# Patient Record
Sex: FEMALE | Race: WHITE
[De-identification: names, ages, dates, MRNs, and addresses within clinical notes are randomized per-mention and may not be internally consistent; named-entity substitution may affect disease eponyms.]

---

## 2018-11-29 ENCOUNTER — HOSPITAL ENCOUNTER (EMERGENCY)
Dept: HOSPITAL 74 - FER | Age: 83
Discharge: HOME | End: 2018-11-29
Payer: COMMERCIAL

## 2018-11-29 VITALS — DIASTOLIC BLOOD PRESSURE: 80 MMHG | HEART RATE: 79 BPM | TEMPERATURE: 97.9 F | SYSTOLIC BLOOD PRESSURE: 135 MMHG

## 2018-11-29 VITALS — BODY MASS INDEX: 21.2 KG/M2

## 2018-11-29 DIAGNOSIS — S09.90XA: Primary | ICD-10-CM
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
## 2018-11-29 DIAGNOSIS — S00.83XA: ICD-10-CM
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
## 2018-11-29 DIAGNOSIS — S80.01XA: ICD-10-CM

## 2018-11-29 NOTE — PDOC
History of Present Illness





- General


History Source: Patient, Family, Old Records


Exam Limitations: No Limitations





- History of Present Illness


Initial Comments: 





11/29/18 20:03


The patient is a 84 year old female presenting with her granddaughters, with a 

significant past medical history of HTN, who presents to the ED complaining of 

hitting her head when she fell earlier today. She reports that she heard some 

noise outside of her home and went out to check. She notes that she fell and 

hit her head and right knee on concrete. She does not illicit pain on 

presentation for her head injury but notes a bruise on her left forehead. She 

denies any factors leading up to her fall. She denies any knee pain but also 

notes a bruise on her knee. She reports that she was feeling fine after the 

fall and did not want to come to the ED. She came to the ED on the behest of 

her family. She denies any kind of pain. 





The patient denies chest pain, shortness of breath, neck pain, back pain, 

headache and dizziness. Denies fever, chills, nausea, vomiting, diarrhea or 

constipation. Denies dysuria, frequency, urgency and hematuria. 





Allergies: None 


Past surgical history: Right knee replacement 


Social History: No alcohol, tobacco or drug use reported 








<Renzo France - Last Filed: 11/29/18 23:20>





<Sofia Nguyen - Last Filed: 11/30/18 00:57>





- General


Chief Complaint: Injury


Stated Complaint: INJURY,FALL HIT HEAD


Time Seen by Provider: 11/29/18 19:21





Past History





<Renzo France - Last Filed: 11/29/18 23:20>





- Past Medical History


HTN: Yes





- Immunization History


Immunization Up to Date: Yes





- Suicide/Smoking/Psychosocial Hx


Smoking History: Unknown if ever smoked


Have you smoked in the past 12 months: No


If you are a former smoker, when did you quit?: Over 40 years ago


Hx Alcohol Use: No


Drug/Substance Use Hx: No


Substance Use Type: None


Hx Substance Use Treatment: No





<Sofia Nguyen - Last Filed: 11/30/18 00:57>





- Past Medical History


Allergies/Adverse Reactions: 


 Allergies











Allergy/AdvReac Type Severity Reaction Status Date / Time


 


No Known Drug Allergies Allergy   Verified 11/29/18 19:21











Home Medications: 


Ambulatory Orders





Cholecalciferol (Vitamin D3) [Vitamin D] 400 unit PO DAILY 08/03/15 


Enalapril Maleate [Vasotec] 25 mg PO DAILY 08/03/15 











**Review of Systems





- Review of Systems


Able to Perform ROS?: Yes


Comments:: 





11/29/18 20:03


GENERAL/CONSTITUTIONAL: No fever or chills. No weakness.


HEAD, EYES, EARS, NOSE AND THROAT: (+) Left forehead bruise. No change in 

vision. No ear pain or discharge. No sore throat.


GASTROINTESTINAL: No nausea, vomiting, diarrhea or constipation.


GENITOURINARY: No dysuria, frequency, or change in urination.


CARDIOVASCULAR: No chest pain or shortness of breath.


RESPIRATORY: No cough, wheezing, or hemoptysis.


MUSCULOSKELETAL: No joint or muscle swelling or pain. No neck or back pain.


SKIN: No rash


EXTREMITIES: (+) Right knee bruise 


NEUROLOGIC: No headache, vertigo, loss of consciousness, or change in strength/

sensation.


ENDOCRINE: No increased thirst. No abnormal weight change.


HEMATOLOGIC/LYMPHATIC: No anemia, easy bleeding, or history of blood clots.


ALLERGIC/IMMUNOLOGIC: No hives or skin allergy.








<Renzo France - Last Filed: 11/29/18 23:20>





*Physical Exam





- Vital Signs


 Last Vital Signs











Temp Pulse Resp BP Pulse Ox


 


 97.9 F   79   20   135/80   96 


 


 11/29/18 19:20  11/29/18 19:20  11/29/18 19:20  11/29/18 19:20  11/29/18 19:20














- Physical Exam


Comments: 





11/29/18 20:03


Constitutional: Awake, alert, oriented.  No acute distress.


Head:  (+) Mildly tender, mildly edematous, mildly ecchymosis 4 cm diameter 

contusion on the left side of her forehead. Normocephalic. 


Eyes:  PERRL. EOMI.  Conjunctivae are not pale.


ENT:  Mucous membranes are moist and intact. Posterior pharynx without exudates 

or erythema. Uvula midline.


Neck:  Supple.  Full ROM. No lymphadenopathy.


Cardiovascular:  Regular rate.  Regular rhythm. S1, S2 regular.  Distal pulses 

are 2+ and symmetric.  


Pulmonary/Chest:  No evidence of respiratory distress.  Clear to auscultation 

bilaterally  No wheezing, rales or rhonchi.


Abdominal:  Soft and non-distended.  There is no tenderness.  No rebound, 

guarding or rigidity.  No organomegaly. No palpable masses. Good bowel sounds.


Back:  No CVA tenderness.


Musculoskeletal:  (+) Faint ecchymosis and non bleeding abrasion of the 

anterior right knee, with also some mild tenderness, no edema, no deformity. No 

cyanosis.  No clubbing.  Full range of motion in all extremities.  No calf 

tenderness. Radial/pedal pulses are intact and 2+ bilaterally


Skin:  Skin is warm and dry.  No petechiae.  No purpura.  


Neurological:  Alert and oriented to person, place, and time.  Cranial nerves II

-XII are grossly intact. Normal speech. Strength is grossly symmetric. No 

sensory deficits.


Psychiatric:  Good eye contact.  Normal interaction, affect and behavior.





<Renzo France - Last Filed: 11/29/18 23:20>





Moderate Sedation





- Procedure Monitoring


Vital Signs: 


Procedure Monitoring Vital Signs











Temperature  97.9 F   11/29/18 19:20


 


Pulse Rate  79   11/29/18 19:20


 


Respiratory Rate  20   11/29/18 19:20


 


Blood Pressure  135/80   11/29/18 19:20


 


O2 Sat by Pulse Oximetry (%)  96   11/29/18 19:20











<Renzo France - Last Filed: 11/29/18 23:20>





Progress Note





- Progress Note


Progress Note: 





Documentation has been prepared under my direction and personally reviewed by 

me in its entirety. I attest that this documented accurately reflects all work, 

treatment, procedures and medical decision making performed by me.





<Sofia Nguyen - Last Filed: 11/30/18 00:57>





Medical Decision Making





- Medical Decision Making





As noted above, this 84-year-old woman presents with a history of tripping and 

falling on an uneven surface outside of her home earlier today.  Patient 

vehemently denies antecedent lightheadedness or other physical symptoms prior 

to falling.  She states that sidewalk was uneven and she tripped.  Patient 

struck her forehead and right knee but denies LOC.  No significant symptoms 

occurred through the afternoon and early evening.  She was convinced by her 

family to come to the emergency room for full evaluation.  Exam as noted.





Although patient is not taking any anticoagulation medications, because of her 

advanced age, noncontrast head CT will be performed to evaluate for acute 

intracranial pathology.  Also, right knee x-ray will be performed.





Noncontrast head CT performed and interpreted by Dr. Moreno of radiology staff: 

No evidence of acute intracranial process present.  There is evidence of 

chronic micro-vascular ischemic changes.  There is no significant change from 

noncontrast head CT performed 4 and half years ago here.





Right knee x-ray shows that patient had total knee replacement in this joint.  

There is no fracture or dislocation; furthermore, no evidence of loosening of 

the hardware in her knee.








<Sofia Nguyen - Last Filed: 11/30/18 00:57>





*DC/Admit/Observation/Transfer





- Attestations


Scribe Attestion: 





11/29/18 20:03





Documentation prepared by Renzo France, acting as medical scribe for Sofia Nguyen MD





<Renzo France - Last Filed: 11/29/18 23:20>





<Sofia Nguyen - Last Filed: 11/30/18 00:57>


Diagnosis at time of Disposition: 


Forehead contusion


Qualifiers:


 Encounter type: initial encounter Qualified Code(s): S00.83XA - Contusion of 

other part of head, initial encounter





Contusion, knee


Qualifiers:


 Encounter type: initial encounter Laterality: right Qualified Code(s): 

S80.01XA - Contusion of right knee, initial encounter





Closed head injury


Qualifiers:


 Encounter type: initial encounter Qualified Code(s): S09.90XA - Unspecified 

injury of head, initial encounter








- Discharge Dispostion


Disposition: HOME


Condition at time of disposition: Stable





- Patient Instructions


Printed Discharge Instructions:  DI for Closed Head Injury


Additional Instructions: 


cold compresses to forehead bruise for the next 2 days


extra pillow tonight


Tylenol as needed for pain for the next 2 days, then Motrin/Aleve/Tylenol as 

needed


return to ER if you have headache/vomiting/lightheadedness


Follow-up with your doctor within the next 5 days

## 2019-02-08 ENCOUNTER — HOSPITAL ENCOUNTER (OUTPATIENT)
Dept: HOSPITAL 74 - JER | Age: 84
Setting detail: OBSERVATION
LOS: 1 days | Discharge: HOME | End: 2019-02-09
Attending: INTERNAL MEDICINE | Admitting: INTERNAL MEDICINE
Payer: COMMERCIAL

## 2019-02-08 VITALS — BODY MASS INDEX: 27.4 KG/M2

## 2019-02-08 DIAGNOSIS — I10: ICD-10-CM

## 2019-02-08 DIAGNOSIS — R55: Primary | ICD-10-CM

## 2019-02-08 PROCEDURE — G0378 HOSPITAL OBSERVATION PER HR: HCPCS

## 2019-02-08 NOTE — PDOC
Attending Attestation





- Resident


Resident Name: Valentina Lancaster





- ED Attending Attestation


I have performed the following: I have examined & evaluated the patient, The 

case was reviewed & discussed with the resident, I agree w/resident's findings 

& plan, Exceptions are as noted





- HPI


HPI: 





02/09/19 00:27


The patient is an 84-year-old female with a past medical history significant 

for HTN presents to the emergency department via EMS s/p a syncopal episode. 

The patient reports she was at a bin  event when she had a 

witnessed syncopal episode that was witnessed by daughter and granddaughter. 

The patient slumped forward with LOC, nearby nurses helped the patient to lay 

down on the floor. Following, the patient spontaneously regained consciousness. 

Immediately following, the patient had an episode of vomiting. Denies any 

symptoms prior to LOC, lightheadedness, headache, neck pain, neurological 

symptoms, chest pain, shortness of breath diarrhea, melena, bpr, fever/chills, 

cough, dysuria,  or known sick contact. 





- Physicial Exam


PE: 





02/09/19 00:06


GENERAL: The patient is awake, alert, and fully oriented, Nontoxic - in no 

acute distress.


HEAD: Normocephalic, atraumatic.


EYES: extraocular movements intact, sclera anicteric, conjunctiva clear.


ENT: Normal voice,  mildly dry mucous membranes.


NECK: Normal range of motion, supple


LUNGS: Breath sounds equal, clear to auscultation bilaterally.  No wheezes, no 

rhonchi, no rales.


HEART: Regular rate and rhythm, normal S1 and S2 without murmur, rub or gallop.


ABDOMEN: Soft, nontender, normoactive bowel sounds.  No guarding, no rebound.  

. No CVA tenderness


EXTREMITIES: Normal range of motion, no edema.  No clubbing or cyanosis. No 

cords, erythema, or tenderness.


NEUROLOGICAL: No facial assymetry, Normal speech, 


PSYCH: Normal mood, normal affect.


SKIN: Warm, Dry, normal turgor,








- Medical Decision Making





02/08/19 23:51


84y F hx of htn, presents sp witnessed syncopal event. Was at a bingo 

 and had a blank look on her face and slumped over. Woke up 

spontaneously when she laid flat, vomited and has been feeling fine since then. 

Deneis any chest pain, palpitations, sob, dizziness, prior n/v, abd pain, back 

pain, nekc pan, fever/chills, cough, sob.  in the field. 








ddx includes but not limited to anemia, metabolic dernagement, arrythmia, acs, 

vagal episode, dehydraiton


kristopher lcklbas, 


pt placed on cardiac monitor


gentle fluids


ekg


ua


will reassess, likely obs if w/o neg to r/o arrythmia

## 2019-02-09 VITALS — DIASTOLIC BLOOD PRESSURE: 80 MMHG | TEMPERATURE: 98 F | SYSTOLIC BLOOD PRESSURE: 152 MMHG | HEART RATE: 64 BPM

## 2019-02-09 LAB
ALBUMIN SERPL-MCNC: 3.2 G/DL (ref 3.4–5)
ALBUMIN SERPL-MCNC: 3.4 G/DL (ref 3.4–5)
ALP SERPL-CCNC: 63 U/L (ref 45–117)
ALP SERPL-CCNC: 68 U/L (ref 45–117)
ALT SERPL-CCNC: 14 U/L (ref 13–61)
ALT SERPL-CCNC: 17 U/L (ref 13–61)
ANION GAP SERPL CALC-SCNC: 5 MMOL/L (ref 8–16)
ANION GAP SERPL CALC-SCNC: 5 MMOL/L (ref 8–16)
AST SERPL-CCNC: 15 U/L (ref 15–37)
AST SERPL-CCNC: 16 U/L (ref 15–37)
BASOPHILS # BLD: 0.9 % (ref 0–2)
BASOPHILS # BLD: 1.1 % (ref 0–2)
BILIRUB SERPL-MCNC: 0.5 MG/DL (ref 0.2–1)
BILIRUB SERPL-MCNC: 0.7 MG/DL (ref 0.2–1)
BUN SERPL-MCNC: 11 MG/DL (ref 7–18)
BUN SERPL-MCNC: 12 MG/DL (ref 7–18)
CALCIUM SERPL-MCNC: 8.2 MG/DL (ref 8.5–10.1)
CALCIUM SERPL-MCNC: 8.7 MG/DL (ref 8.5–10.1)
CHLORIDE SERPL-SCNC: 102 MMOL/L (ref 98–107)
CHLORIDE SERPL-SCNC: 103 MMOL/L (ref 98–107)
CO2 SERPL-SCNC: 31 MMOL/L (ref 21–32)
CO2 SERPL-SCNC: 32 MMOL/L (ref 21–32)
CREAT SERPL-MCNC: 0.7 MG/DL (ref 0.55–1.3)
CREAT SERPL-MCNC: 0.7 MG/DL (ref 0.55–1.3)
DEPRECATED RDW RBC AUTO: 14.2 % (ref 11.6–15.6)
DEPRECATED RDW RBC AUTO: 14.2 % (ref 11.6–15.6)
EOSINOPHIL # BLD: 1.9 % (ref 0–4.5)
EOSINOPHIL # BLD: 2.2 % (ref 0–4.5)
GLUCOSE SERPL-MCNC: 120 MG/DL (ref 74–106)
GLUCOSE SERPL-MCNC: 90 MG/DL (ref 74–106)
HCT VFR BLD CALC: 39.3 % (ref 32.4–45.2)
HCT VFR BLD CALC: 40.1 % (ref 32.4–45.2)
HGB BLD-MCNC: 13.5 GM/DL (ref 10.7–15.3)
HGB BLD-MCNC: 13.7 GM/DL (ref 10.7–15.3)
LYMPHOCYTES # BLD: 13.1 % (ref 8–40)
LYMPHOCYTES # BLD: 19.7 % (ref 8–40)
MAGNESIUM SERPL-MCNC: 2.2 MG/DL (ref 1.8–2.4)
MAGNESIUM SERPL-MCNC: 2.4 MG/DL (ref 1.8–2.4)
MCH RBC QN AUTO: 30.9 PG (ref 25.7–33.7)
MCH RBC QN AUTO: 31.1 PG (ref 25.7–33.7)
MCHC RBC AUTO-ENTMCNC: 34.3 G/DL (ref 32–36)
MCHC RBC AUTO-ENTMCNC: 34.4 G/DL (ref 32–36)
MCV RBC: 90 FL (ref 80–96)
MCV RBC: 90.3 FL (ref 80–96)
MONOCYTES # BLD AUTO: 10.8 % (ref 3.8–10.2)
MONOCYTES # BLD AUTO: 9.8 % (ref 3.8–10.2)
NEUTROPHILS # BLD: 66.5 % (ref 42.8–82.8)
NEUTROPHILS # BLD: 74 % (ref 42.8–82.8)
PHOSPHATE SERPL-MCNC: 3.1 MG/DL (ref 2.5–4.9)
PHOSPHATE SERPL-MCNC: 3.1 MG/DL (ref 2.5–4.9)
PLATELET # BLD AUTO: 242 K/MM3 (ref 134–434)
PLATELET # BLD AUTO: 247 K/MM3 (ref 134–434)
PMV BLD: 7.4 FL (ref 7.5–11.1)
PMV BLD: 7.5 FL (ref 7.5–11.1)
POTASSIUM SERPLBLD-SCNC: 4.1 MMOL/L (ref 3.5–5.1)
POTASSIUM SERPLBLD-SCNC: 4.4 MMOL/L (ref 3.5–5.1)
PROT SERPL-MCNC: 6.3 G/DL (ref 6.4–8.2)
PROT SERPL-MCNC: 6.7 G/DL (ref 6.4–8.2)
RBC # BLD AUTO: 4.35 M/MM3 (ref 3.6–5.2)
RBC # BLD AUTO: 4.45 M/MM3 (ref 3.6–5.2)
SODIUM SERPL-SCNC: 138 MMOL/L (ref 136–145)
SODIUM SERPL-SCNC: 139 MMOL/L (ref 136–145)
WBC # BLD AUTO: 8.5 K/MM3 (ref 4–10)
WBC # BLD AUTO: 9.8 K/MM3 (ref 4–10)

## 2019-02-09 PROCEDURE — 3E0337Z INTRODUCTION OF ELECTROLYTIC AND WATER BALANCE SUBSTANCE INTO PERIPHERAL VEIN, PERCUTANEOUS APPROACH: ICD-10-PCS | Performed by: INTERNAL MEDICINE

## 2019-02-09 NOTE — PDOC
History of Present Illness





- General


Chief Complaint: Syncope/Near Syncope


Stated Complaint: SYNCOPE VOMITING


Time Seen by Provider: 02/08/19 23:42





- History of Present Illness


Initial Comments: 


Asad Dykes is an 83yo woman with a PMH of HTN who presents after a 

witnessed episode of syncope and vomiting tonight. She presents with her 

daughter and granddaughter, who witnessed the event. Ms Dykes was with her 

family playing Bingo at a [employer] event tonight. Her daughter states that she 

suddenly had a "blank look" on her face and then slumped forward in her chair. 

There were several nurses present at the event, and they helped lay her down 

flat on the ground. She regained consciousness after about a minute or less, 

and she had an episode of vomiting after waking up. 





Ms Dykes denies any symptoms preceding the syncope including lightheadedness

, nausea, chest pain, SOB, weakness or any neurological symptoms. After waking, 

she states that she has felt in her normal state of health and currently denies 

any symptoms. 








Past History





- Past Medical History


Allergies/Adverse Reactions: 


 Allergies











Allergy/AdvReac Type Severity Reaction Status Date / Time


 


No Known Drug Allergies Allergy   Verified 02/08/19 22:59











Home Medications: 


Ambulatory Orders





Ramipril 2.5 mg PO DAILY 02/09/19 








COPD: No


HTN: Yes





- Immunization History


Immunization Up to Date: Yes





- Suicide/Smoking/Psychosocial Hx


Smoking History: Never smoked


Have you smoked in the past 12 months: No


If you are a former smoker, when did you quit?: Over 40 years ago


Information on smoking cessation initiated: No


Hx Alcohol Use: No


Drug/Substance Use Hx: No


Substance Use Type: None


Hx Substance Use Treatment: No





**Review of Systems





- Review of Systems


Comments:: 


General: No fevers, no chills, no weight or appetite change, no malaise


HEENT: No changes in vision, no changes in hearing, no congestion, no sore 

throat


CV: No chest pain, no palpitations, no LE edema. h/o HTN


Pulm: No SOB, no cough, no wheezing


GI: No nausea or vomiting, no change in bowel habits, no melena


: No frequency, no urgency, no dysuria


Musc: No back pain, no joint swelling, no recent injury


Skin: No rash, no lesions, no erythema


Endo: No excessive thirst, no heat/cold intolerance


Heme: No unusual bruising or bleeding, no swollen glands


Neuro: +syncope. No numbness/tingling, no focal weakness


Vasc: No claudication


Psych: No recent change in mood, no SI or HI











*Physical Exam





- Vital Signs


 Last Vital Signs











Temp Pulse Resp BP Pulse Ox


 


 97.6 F   66   18   132/89   99 


 


 02/08/19 23:00  02/08/19 23:00  02/08/19 23:00  02/08/19 23:00  02/08/19 23:00














- Physical Exam


Comments: 


General: Comfortable, no acute distress


HEENT: PERRL, EOMI, MMM, voice normal, normal neck ROM, no LAD


Cards: RRR, no murmur appreciated


Pulm: Comfortable on room air, clear to auscultation bilaterally


Abd: Soft, nontender, nondistended


: No CVA tenderness


Ext: Atraumatic. Trace BLE edema. ROM intact. Strength 5/5 and equal bilaterally


Vasc: Extremities WWP. 


Skin: Normal color, no rashes or lesions


Neuro: A&Ox3, CN grossly intact, normal speech, motor/sensory grossly intact 

and symmetric. No focal deficits noted. 


Psych: Mood appropriate to situation











Moderate Sedation





- Procedure Monitoring


Vital Signs: 


Procedure Monitoring Vital Signs











Temperature  97.6 F   02/08/19 23:00


 


Pulse Rate  66   02/08/19 23:00


 


Respiratory Rate  18   02/08/19 23:00


 


Blood Pressure  132/89   02/08/19 23:00


 


O2 Sat by Pulse Oximetry (%)  99   02/08/19 23:00











ED Treatment Course





- LABORATORY


CBC & Chemistry Diagram: 


 02/09/19 00:25





 02/09/19 00:25





- RADIOLOGY


Radiology Studies Ordered: 














 Category Date Time Status


 


 CHEST PA & LAT [RAD] Stat Radiology  02/08/19 23:39 Ordered














Medical Decision Making





- Medical Decision Making





02/08/19 23:58


Asad Dykes is an 83yo woman with a PMH of HTN who presents after a 

witnessed episode of syncope followed by vomiting while playing Bingo tonight. 

She is currently asymptomatic.


- Unclear cause at this point, pt denies any symptoms prior to or following the 

syncope event


- No fall, no head injury or neurological symptoms indicating neuro workup. No 

need for imaging


- May be arrhythmia, orthostatic, vagal. No SOB or chest pain suggesting MI, no 

palpitations, no h/o arrhythmia. Pt does report low fluid intake at baseline 

and appears slightly dry, but no significant dehydration nor orthostatic 

symptoms.


- CBC, CMP, mag, phos, UA, CXR, EKG for evaluation


- 500cc bolus NS for rehydration





02/09/19 01:14


- EKG completed. NSR w/ one PAC noted. HR 66, normal intervals, normal axis, no 

ST changes


- Labs reviewed. Unremarkable, no concerning abnormalities


- Discussed admission with Ms Dykes and her daughter. Recommend obs with 

cardiac monitoring to rule out arrhythmia as a cause of syncope. She agrees to 

admission. Will contact hospitalist after CXR is completed.








02/09/19 02:32


- Hospitalist team contacted, will admit for syncope workup/evaluation


- CXR completed. No significant change from previous.





Discussed with Dr Arellano.





Valentina Lancaster


PGY1





*DC/Admit/Observation/Transfer


Diagnosis at time of Disposition: 


Syncope


Qualifiers:


 Syncope type: unspecified Qualified Code(s): R55 - Syncope and collapse








- Discharge Dispostion


Decision to Admit order: Yes





- Referrals





- Patient Instructions





- Post Discharge Activity

## 2019-02-09 NOTE — HP
CHIEF COMPLAINT: syncope





PCP: Dr. Orta





HISTORY OF PRESENT ILLNESS: Patient is an 85 yo F with a PMHx of HTN, who 

presented after a witnessed syncopal episode while playing BINGO at a naveen 

event. Patient's daughter at bedside. She states her mom suddenly had a blank 

look on her face and fell forward on her chair. She had LOC for under a minute. 

Daughter also states she vomited right after she regained consciousness. 

Patient denies preceding symptoms prior to the syncopal episode. She also says 

she has not been drinking enough water lately. She said she had a similar 

episode in 2014 and was admitted. Patient also told the ER resident that she 

sometimes has very low blood pressure. She denies lightheadedness, nausea, 

chest pain, SOB, weakness or any neurological symptoms.  








ER course was notable for:


(1) 1/2 L NS bolus in ER


(2) Trop neg x 1








Recent Travel: denies





PAST MEDICAL HISTORY: per HPI








Family History:


Allergies





No Known Drug Allergies Allergy (Verified 02/08/19 22:59)


 








HOME MEDICATIONS:


 Home Medications











 Medication  Instructions  Recorded


 


Ramipril 2.5 mg PO DAILY 02/09/19








REVIEW OF SYSTEMS


CONSTITUTIONAL: 


Absent:  fever, chills, diaphoresis, generalized weakness, malaise, loss of 

appetite, weight change


HEENT: 


Absent:  rhinorrhea, nasal congestion, throat pain, throat swelling, difficulty 

swallowing, mouth swelling, ear pain, eye pain, visual changes


CARDIOVASCULAR: syncope


Absent: chest pain,  palpitations, irregular heart rate, lightheadedness, 

peripheral edema


RESPIRATORY: 


Absent: cough, shortness of breath, dyspnea with exertion, orthopnea, wheezing, 

stridor, hemoptysis


GASTROINTESTINAL:


Absent: abdominal pain, abdominal distension, nausea, vomiting, diarrhea, 

constipation, melena, hematochezia


GENITOURINARY: 


Absent: dysuria, frequency, urgency, hesitancy, hematuria, flank pain, genital 

pain


NEUROLOGIC: 


Absent: headache, focal weakness or paresthesias, dizziness, unsteady gait, 

seizure, mental status changes, bladder or bowel incontinence








PHYSICAL EXAMINATION


 Vital Signs - 24 hr











  02/08/19





  23:00


 


Temperature 97.6 F


 


Pulse Rate 66


 


Respiratory 18





Rate 


 


Blood Pressure 132/89


 


O2 Sat by Pulse 99





Oximetry (%) 











GENERAL: a/o x 3, nad


HEAD: Normal with no signs of trauma.


EYES: Pupils equal, round and reactive to light, extraocular movements intact, 

sclera anicteric, conjunctiva clear. No lid lag.


EARS, NOSE, THROAT:  oropharynx clear without exudates. Moist mucous membranes.


NECK: supple without lymphadenopathy, JVD, or masses.


LUNGS: Breath sounds equal, clear to auscultation bilaterally. No wheezes, and 

no crackles.


HEART: Regular rate and rhythm, normal S1 and S2 without murmur, rub or gallop.


ABDOMEN: Soft, nontender, not distended, normoactive bowel sounds, no guarding, 

no rebound, no masses.  


LOWER EXTREMITIES: 2+ pulses, warm, well-perfused. No calf tenderness. No 

peripheral edema. 


NEUROLOGICAL:  Cranial nerves II-XII intact. Normal speech. Normal gait.





 Laboratory Results - last 24 hr











  02/09/19 02/09/19





  00:25 00:25


 


WBC  9.8 


 


RBC  4.45 


 


Hgb  13.7 


 


Hct  40.1 


 


MCV  90.0 


 


MCH  30.9 


 


MCHC  34.3 


 


RDW  14.2 


 


Plt Count  242  D 


 


MPV  7.4 L 


 


Absolute Neuts (auto)  7.2 


 


Neutrophils %  74.0  D 


 


Lymphocytes %  13.1  D 


 


Monocytes %  9.8 


 


Eosinophils %  2.2 


 


Basophils %  0.9 


 


Nucleated RBC %  0 


 


Sodium   139


 


Potassium   4.4


 


Chloride   103


 


Carbon Dioxide   32


 


Anion Gap   5 L


 


BUN   12


 


Creatinine   0.7


 


Creat Clearance w eGFR   > 60


 


Random Glucose   120 H


 


Calcium   8.7


 


Phosphorus   3.1


 


Magnesium   2.4


 


Total Bilirubin   0.5


 


AST   16


 


ALT   17


 


Alkaline Phosphatase   68


 


Creatine Kinase   76


 


Troponin I   < 0.02


 


Total Protein   6.7


 


Albumin   3.4











ASSESSMENT/PLAN:





85 yo F with a PMHx of HTN, who presented after a witnessed syncopal episode 

and vomiting.








#Syncope


-cardiogenic vs vasovagal vs orthostatic


-tele monitoring


-cardiology consult: Dr. Iniguez is the last Cardiologist she saw


-First trop neg, repeat Trop


-Echo in AM. Last echo in 2014


-TSH


-U/A





#HTN


-cont. Home med: Rampril 2.5mg


-monitor BP








#FEN


-No Iv fluids


-monitor lytes


-Sodium controlled diet





#DVT


-EAB


-Scds





tele-obs











Visit type





- Emergency Visit


Emergency Visit: Yes


ED Registration Date: 02/09/19


Care time: The patient presented to the Emergency Department on the above date 

and was hospitalized for further evaluation of their emergent condition.





- New Patient


This patient is new to me today: Yes


Date on this admission: 02/11/19





- Critical Care


Critical Care patient: No

## 2019-02-09 NOTE — EKG
Test Reason : 

Blood Pressure : ***/*** mmHG

Vent. Rate : 066 BPM     Atrial Rate : 066 BPM

   P-R Int : 174 ms          QRS Dur : 084 ms

    QT Int : 432 ms       P-R-T Axes : 051 065 051 degrees

   QTc Int : 452 ms

 

NORMAL SINUS RHYTHM

NORMAL ECG

WHEN COMPARED WITH ECG OF 03-AUG-2015 14:18,

NO SIGNIFICANT CHANGE WAS FOUND

Confirmed by Jonah Lance MD ([id number]) on 2/9/2019 3:29:19 PM

 

Referred By:             Confirmed By:Jonah Lance MD

## 2019-02-09 NOTE — CON.CARD
Cardiology Consult (text)





- Consultation


Consultation Note: 





cc: syncope





hpi:  84 f hx htn here with syncope.  Pt was sitting playing bingo yesterday 

when she felt warm and then passed out.  Daughter was there and said her mom 

looked pale and slumped onto table and had loc for about a minute. She awoke 

and vomited and felt fine after.  No cp sob palps dizzy pnd orthopnea le edema.

  She had similar episode in 2014 and again in 2018.  Pt feels well now.





pmh: per hpi


psh: nc


social: no tob


fam:  no premature cad, scd


ros: per hpi; no fever, cough ha vision changes gib hematuria dysuria muscle 

pains


meds:


 Home Medications











 Medication  Instructions  Recorded


 


Ramipril 2.5 mg PO DAILY 02/09/19








pe:


 Vital Signs











 Period  Temp  Pulse  Resp  BP Sys/Tate  Pulse Ox


 


 Last 24 Hr  97.6 F-98.2 F  64-66  18-19  132-152/72-89  97-99








nad no jvd


rrr s1s2 no mrg


cta bl nl eff


aaox3


no le e/c/c


abd nt nd pos bs


no jaundice diaphoresis


pos dp pt no carotid bruits





 Laboratory Last Values











WBC  8.5 K/mm3 (4.0-10.0)   02/09/19  07:00    


 


RBC  4.35 M/mm3 (3.60-5.2)   02/09/19  07:00    


 


Hgb  13.5 GM/dL (10.7-15.3)   02/09/19  07:00    


 


Hct  39.3 % (32.4-45.2)   02/09/19  07:00    


 


MCV  90.3 fl (80-96)   02/09/19  07:00    


 


MCH  31.1 pg (25.7-33.7)   02/09/19  07:00    


 


MCHC  34.4 g/dl (32.0-36.0)   02/09/19  07:00    


 


RDW  14.2 % (11.6-15.6)   02/09/19  07:00    


 


Plt Count  247 K/MM3 (134-434)   02/09/19  07:00    


 


MPV  7.5 fl (7.5-11.1)   02/09/19  07:00    


 


Absolute Neuts (auto)  5.7 K/mm3 (1.5-8.0)   02/09/19  07:00    


 


Neutrophils %  66.5 % (42.8-82.8)   02/09/19  07:00    


 


Lymphocytes %  19.7 % (8-40)  D 02/09/19  07:00    


 


Monocytes %  10.8 % (3.8-10.2)  H  02/09/19  07:00    


 


Eosinophils %  1.9 % (0-4.5)   02/09/19  07:00    


 


Basophils %  1.1 % (0-2.0)   02/09/19  07:00    


 


Nucleated RBC %  0 % (0-0)   02/09/19  07:00    


 


Sodium  138 mmol/L (136-145)   02/09/19  07:00    


 


Potassium  4.1 mmol/L (3.5-5.1)   02/09/19  07:00    


 


Chloride  102 mmol/L ()   02/09/19  07:00    


 


Carbon Dioxide  31 mmol/L (21-32)   02/09/19  07:00    


 


Anion Gap  5 MMOL/L (8-16)  L  02/09/19  07:00    


 


BUN  11 mg/dL (7-18)   02/09/19  07:00    


 


Creatinine  0.7 mg/dL (0.55-1.3)   02/09/19  07:00    


 


Creat Clearance w eGFR  > 60  (>60)   02/09/19  07:00    


 


Random Glucose  90 mg/dL ()   02/09/19  07:00    


 


Calcium  8.2 mg/dL (8.5-10.1)  L  02/09/19  07:00    


 


Phosphorus  3.1 mg/dL (2.5-4.9)   02/09/19  07:00    


 


Magnesium  2.2 mg/dL (1.8-2.4)   02/09/19  07:00    


 


Total Bilirubin  0.7 mg/dL (0.2-1)   02/09/19  07:00    


 


AST  15 U/L (15-37)   02/09/19  07:00    


 


ALT  14 U/L (13-61)   02/09/19  07:00    


 


Alkaline Phosphatase  63 U/L ()   02/09/19  07:00    


 


Creatine Kinase  76 U/L ()   02/09/19  00:25    


 


Troponin I  < 0.02 ng/ml (0.00-0.05)   02/09/19  07:00    


 


Total Protein  6.3 g/dl (6.4-8.2)  L  02/09/19  07:00    


 


Albumin  3.2 g/dl (3.4-5.0)  L  02/09/19  07:00    


 


Vitamin B12  566 pg/ml (193-986)   02/09/19  07:00    


 


Serum Folate  17 ng/mL (3.1-17.5)   02/09/19  07:00    











cxr: clear lungs





echo 9/2014: nl lv/rv, mild mr, mild tr, rvsp 30-40, mild pr





tele:  sr





a/p:  84 f hx htn here with syncope.





syncope:


-pt had similar episodes in 2014 and 2018


-was worked up in past with echo, holter, carotids, all unremarkable


-episode thought to be vasvovagal in past and seems that way again


-no signs acs, chf, arrhythmia here


-rec'd increase po hydration


-will f/u in office and plan for 30 day event monitor





htn:


-cont ace-i





cardiac wise ok for dc

## 2019-02-09 NOTE — PN
Teaching Attending Note


Name of Resident: Rivka Powell





ATTENDING PHYSICIAN STATEMENT





I saw and evaluated the patient.


I reviewed the resident's note and discussed the case with the resident.


I agree with the resident's findings and plan as documented.








SUBJECTIVE:


Seen and examined; please see resident note for further historical details.  

Briefly, this is an 85 y/o presenting to the ER with a CC of witnessed syncope 

with +LOC while at a [employer] event.  Has had syncope in the past back in 

2014 and was seen here by CV; had a negative workup with echo and holter; was 

seen by neurology and had an extensive workup and cleared for DC as well.  She 

denies prodromal sx.  Has had somewhat poor PO intake.  Hemodynamically stable 

and afebrile.  Her daughter reveals she had a similar episode several months 

ago. Denies stress, etc.  EKG reviewed; no high degree av block.  Monitoring on 

telemetry.  She states that she feels fine and has no complaints when I saw her.





10 sys ROS done and negative aside from HPI


PMH, PSH, Family hx, Social hx reviewed


Medications reviewed; pending reconciliation








OBJECTIVE:


VS, labs, imaging reviewed


NAD, AAO, resting comfortably in bed


RRR s1/2 no mgr


Lungs CTAB, w/ sym exp


NT ND +BS


CN2-12 wnl, no fnd


Normal mood, appropriate behavior








ASSESSMENT AND PLAN:


Patient presents with syncopal episode; now asymptomatic.  Monitoring on 

telemetry and will workup for recurring syncope.





1) Syncope


-Check orthostatics, echo, carotid dopplers


-Consult CV; appreciate expert opinion


-PT referral, monitor on telemetry


-As this is recurrent, would recommend trying CINTHYA stockings, etc.


-Given poor PO intake hx can do very light hydration with LR@60





2) HTN


-Continue home ramipril





FENA


-LR@60


-PRN replete


-Regular diet


-As tolerated





Appreciate expert cardiology opinion





Full Code